# Patient Record
Sex: MALE | Race: WHITE | NOT HISPANIC OR LATINO | URBAN - NONMETROPOLITAN AREA
[De-identification: names, ages, dates, MRNs, and addresses within clinical notes are randomized per-mention and may not be internally consistent; named-entity substitution may affect disease eponyms.]

---

## 2017-05-02 PROBLEM — H52.223: Noted: 2017-05-02

## 2017-05-02 PROBLEM — E11.9: Noted: 2017-05-02

## 2017-05-02 PROBLEM — H52.4: Noted: 2017-05-02

## 2017-05-02 PROBLEM — H52.02: Noted: 2017-05-02

## 2019-05-01 ENCOUNTER — IMPORTED ENCOUNTER (OUTPATIENT)
Dept: URBAN - NONMETROPOLITAN AREA CLINIC 1 | Facility: CLINIC | Age: 54
End: 2019-05-01

## 2019-05-01 PROCEDURE — S0621 ROUTINE OPHTHALMOLOGICAL EXA: HCPCS

## 2019-05-01 NOTE — PATIENT DISCUSSION
"Has myotonic dystrophy (brother and sister just passes away from same in [facility]). Discussed incomplete closure. Use frequent AT (sample Refresh [employer]). ""DM s DR-Stressed the importance of keeping blood sugars under control blood pressure under control and weight normalization and regular visits with PCP. -Explained the possible effects of poorly controlled diabetes and the damage that diabetes can cause to ocular health. -Patient to check HgbA1C.-Pt instructed to contact our office with any vision changes.; Dr's Notes: Lives with his sister Reji Early. Moved here from from [facility]. Brother  recently from myotonic dystrophy. Enjoys trout fishing. Loves hockey. PCP Dimas Lemus NP"

## 2021-04-13 ENCOUNTER — IMPORTED ENCOUNTER (OUTPATIENT)
Dept: URBAN - NONMETROPOLITAN AREA CLINIC 1 | Facility: CLINIC | Age: 56
End: 2021-04-13

## 2021-04-13 PROCEDURE — 92014 COMPRE OPH EXAM EST PT 1/>: CPT

## 2021-04-13 NOTE — PATIENT DISCUSSION
"Has myotonic dystrophy (brother and sister just passes away from same in [facility]). Discussed incomplete closure. Use frequent AT (sample Refresh Optive). ""DM s DR-Stressed the importance of keeping blood sugars under control blood pressure under control and weight normalization and regular visits with PCP. -Explained the possible effects of poorly controlled diabetes and the damage that diabetes can cause to ocular health. -Patient to check HgbA1C.-Pt instructed to contact our office with any vision changes.; Dr's Notes: Lives with his sister Judge Castro. Moved here from from [facility]. Brother  recently from myotonic dystrophy. Enjoys trout fishing. Loves hockey. PCP Onofre Moses NP"

## 2022-04-09 ASSESSMENT — VISUAL ACUITY
OD_CC: 20/50
OD_CC: 20/30-2
OS_SC: J3
OS_CC: 20/40-1
OD_SC: J3
OS_CC: 20/40-2

## 2022-04-09 ASSESSMENT — TONOMETRY
OS_IOP_MMHG: 14
OS_IOP_MMHG: 14
OD_IOP_MMHG: 14
OD_IOP_MMHG: 14